# Patient Record
Sex: FEMALE | URBAN - METROPOLITAN AREA
[De-identification: names, ages, dates, MRNs, and addresses within clinical notes are randomized per-mention and may not be internally consistent; named-entity substitution may affect disease eponyms.]

---

## 2023-09-06 ENCOUNTER — APPOINTMENT (OUTPATIENT)
Dept: URBAN - METROPOLITAN AREA SURGERY 9 | Age: 43
Setting detail: DERMATOLOGY
End: 2023-09-06

## 2023-09-06 DIAGNOSIS — D18.0 HEMANGIOMA: ICD-10-CM

## 2023-09-06 DIAGNOSIS — D22 MELANOCYTIC NEVI: ICD-10-CM

## 2023-09-06 DIAGNOSIS — L81.4 OTHER MELANIN HYPERPIGMENTATION: ICD-10-CM

## 2023-09-06 PROBLEM — D22.5 MELANOCYTIC NEVI OF TRUNK: Status: ACTIVE | Noted: 2023-09-06

## 2023-09-06 PROBLEM — D18.01 HEMANGIOMA OF SKIN AND SUBCUTANEOUS TISSUE: Status: ACTIVE | Noted: 2023-09-06

## 2023-09-06 PROCEDURE — OTHER REASSURANCE: OTHER

## 2023-09-06 PROCEDURE — OTHER COUNSELING: OTHER

## 2023-09-06 PROCEDURE — OTHER MIPS QUALITY: OTHER

## 2023-09-06 PROCEDURE — 99203 OFFICE O/P NEW LOW 30 MIN: CPT

## 2023-09-06 ASSESSMENT — LOCATION ZONE DERM
LOCATION ZONE: ARM
LOCATION ZONE: TRUNK
LOCATION ZONE: FACE

## 2023-09-06 ASSESSMENT — LOCATION DETAILED DESCRIPTION DERM
LOCATION DETAILED: LEFT ANTERIOR PROXIMAL UPPER ARM
LOCATION DETAILED: RIGHT ANTERIOR PROXIMAL UPPER ARM
LOCATION DETAILED: RIGHT FOREHEAD
LOCATION DETAILED: PERIUMBILICAL SKIN
LOCATION DETAILED: SUPERIOR THORACIC SPINE
LOCATION DETAILED: LEFT MEDIAL UPPER BACK
LOCATION DETAILED: LEFT SUPERIOR UPPER BACK
LOCATION DETAILED: EPIGASTRIC SKIN

## 2023-09-06 ASSESSMENT — LOCATION SIMPLE DESCRIPTION DERM
LOCATION SIMPLE: RIGHT FOREHEAD
LOCATION SIMPLE: LEFT UPPER ARM
LOCATION SIMPLE: LEFT UPPER BACK
LOCATION SIMPLE: ABDOMEN
LOCATION SIMPLE: RIGHT UPPER ARM
LOCATION SIMPLE: UPPER BACK

## 2023-09-06 NOTE — PROCEDURE: COUNSELING
Detail Level: Generalized
Sunscreen Recommendations: broad spectrum sunscreen SPF 30 or greater daily, reapply at least every 2 hours
Sunscreen Recommendation Label Override: Sunscreen